# Patient Record
Sex: FEMALE | Race: WHITE | NOT HISPANIC OR LATINO | Employment: FULL TIME | URBAN - METROPOLITAN AREA
[De-identification: names, ages, dates, MRNs, and addresses within clinical notes are randomized per-mention and may not be internally consistent; named-entity substitution may affect disease eponyms.]

---

## 2019-07-25 ENCOUNTER — OFFICE VISIT (OUTPATIENT)
Dept: FAMILY MEDICINE CLINIC | Facility: CLINIC | Age: 19
End: 2019-07-25

## 2019-07-25 VITALS
DIASTOLIC BLOOD PRESSURE: 60 MMHG | TEMPERATURE: 98.6 F | RESPIRATION RATE: 16 BRPM | BODY MASS INDEX: 18.25 KG/M2 | HEART RATE: 88 BPM | OXYGEN SATURATION: 99 % | SYSTOLIC BLOOD PRESSURE: 96 MMHG | WEIGHT: 103 LBS | HEIGHT: 63 IN

## 2019-07-25 DIAGNOSIS — Z00.00 ANNUAL PHYSICAL EXAM: Primary | ICD-10-CM

## 2019-07-25 PROCEDURE — 99395 PREV VISIT EST AGE 18-39: CPT | Performed by: NURSE PRACTITIONER

## 2019-07-25 NOTE — PROGRESS NOTES
"  Chief Complaint   Patient presents with   • Establish Care   • Annual Exam       Subjective   Deborah Padilla is a 19 y.o. female and is here for new patient evaluation for a yearly physical exam. The patient reports no problems.  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  Clinicals start in fall. Needs to assure immunizations are up-to-date.      Do you take any herbs or supplements that were not prescribed by a doctor? no. If so, these will be added to active medication list.    The following portions of the patient's history were reviewed and updated as appropriate: allergies, current medications, past family history, past medical history, past social history, past surgical history and problem list.    Social and Family and Surgical History reviewed and updated today, see Rooming tab.    Health History, Preventive Measures and Vaccination flow sheets reviewed and updated today.    Patient's current medical chart in Epic; including previous office notes, imaging, labs, specialist's evaluation either in notes or in Media tab reviewed today.    Other pertinent medical information also reviewed thru Care Everywhere function is also reviewed today.    Review of Systems  Review of Systems  A comprehensive review of systems was negative.    Vitals:    07/25/19 0925   BP: 96/60   BP Location: Left arm   Patient Position: Sitting   Cuff Size: Adult   Pulse: 88   Resp: 16   Temp: 98.6 °F (37 °C)   SpO2: 99%   Weight: 46.7 kg (103 lb)   Height: 160 cm (63\")       General Appearance:  Alert, cooperative, no distress, appears stated age   Head:  Normocephalic, without obvious abnormality, atraumatic   Eyes:  PERRL, conjunctiva/corneas clear, EOM's intact.   Ears:  Normal TM's and external ear canals, both ears   Nose: Nares normal, septum midline, mucosa normal, no drainage or sinus tenderness   Throat: Lips, mucosa, and tongue normal; teeth and gums normal   Neck: Supple, symmetrical, trachea midline, no " adenopathy;   thyroid: No enlargement/tenderness/nodules; no carotid  bruit   Back:  Symmetric, no curvature, ROM normal, no CVA tenderness   Lungs:  Clear to auscultation bilaterally, respirations unlabored   Chest wall:  No tenderness or deformity   Heart:  Regular rate and rhythm, S1 and S2 normal, no murmur, rub or gallop   Abdomen:  Soft, non-tender, bowel sounds active all four quadrants,   no masses, no organomegaly   Rectal: Deffered       Extremities: Extremities normal, atraumatic, no cyanosis or edema   Pulses: 2+ and symmetric all extremities   Skin: Skin color, texture, turgor normal, no rashes or lesions   Lymph nodes: Cervical, supraclavicular, and axillary nodes normal   Neurologic: CNII-XII intact. Normal strength, sensation and reflexes   throughout        No results found for this or any previous visit.  Assessment/Plan   Healthy female exam.  Deborah was seen today for establish care and annual exam.    Diagnoses and all orders for this visit:    Annual physical exam  -     CBC & Differential  -     Comprehensive Metabolic Panel  -     TSH  -     Lipid Panel      1. Well adult exam  2. Patient Counseling:  --Nutrition: Stressed importance of moderation in sodium/caffeine intake, saturated fat and cholesterol.  Discussed caloric balance, sufficient intake of fresh fruits, vegetables, fiber, calcium, iron.  --Exercise: Stressed the importance of regular exercise.  --Substance Abuse: Discussed cessation/primary prevention of tobacco, alcohol, or other drug use; driving or other dangerous activities under the influence.    --Dental health: Discussed importance of regular tooth brushing, flossing, and dental visits.  -- suggested having eyes and vision checked if needed or past due.  --Immunizations reviewed.  She is up-to-date on all immunizations.  Form completed for nursing school showing immunizations are up-to-date and will be scanned under media tab.    ---Cholesterol screening:  Fasting labs  will be completed today and will call with results.    3. Discussed the patient's BMI with her.  The BMI is in the acceptable range  4. Follow up in one year for next annual physical including Pap.      There are no Patient Instructions on file for this visit.    There are no discontinued medications.     Mojgan Henry, GABRIELLA  Family Practice  [place], [place]  [facility]

## 2019-07-26 LAB
ALBUMIN SERPL-MCNC: 4.6 G/DL (ref 3.5–5.2)
ALBUMIN/GLOB SERPL: 1.4 G/DL
ALP SERPL-CCNC: 41 U/L (ref 39–117)
ALT SERPL-CCNC: 7 U/L (ref 1–33)
AST SERPL-CCNC: 14 U/L (ref 1–32)
BASOPHILS # BLD AUTO: 0.04 10*3/MM3 (ref 0–0.2)
BASOPHILS NFR BLD AUTO: 0.6 % (ref 0–1.5)
BILIRUB SERPL-MCNC: 0.4 MG/DL (ref 0.2–1.2)
BUN SERPL-MCNC: 11 MG/DL (ref 6–20)
BUN/CREAT SERPL: 13.1 (ref 7–25)
CALCIUM SERPL-MCNC: 9.4 MG/DL (ref 8.6–10.5)
CHLORIDE SERPL-SCNC: 107 MMOL/L (ref 98–107)
CHOLEST SERPL-MCNC: 151 MG/DL (ref 0–200)
CO2 SERPL-SCNC: 23.7 MMOL/L (ref 22–29)
CREAT SERPL-MCNC: 0.84 MG/DL (ref 0.57–1)
EOSINOPHIL # BLD AUTO: 0.06 10*3/MM3 (ref 0–0.4)
EOSINOPHIL NFR BLD AUTO: 0.9 % (ref 0.3–6.2)
ERYTHROCYTE [DISTWIDTH] IN BLOOD BY AUTOMATED COUNT: 12.5 % (ref 12.3–15.4)
GLOBULIN SER CALC-MCNC: 3.2 GM/DL
GLUCOSE SERPL-MCNC: 80 MG/DL (ref 65–99)
HCT VFR BLD AUTO: 42.7 % (ref 34–46.6)
HDLC SERPL-MCNC: 57 MG/DL (ref 40–60)
HGB BLD-MCNC: 13.9 G/DL (ref 12–15.9)
IMM GRANULOCYTES # BLD AUTO: 0.03 10*3/MM3 (ref 0–0.05)
IMM GRANULOCYTES NFR BLD AUTO: 0.4 % (ref 0–0.5)
LDLC SERPL CALC-MCNC: 71 MG/DL (ref 0–100)
LYMPHOCYTES # BLD AUTO: 1.43 10*3/MM3 (ref 0.7–3.1)
LYMPHOCYTES NFR BLD AUTO: 20.5 % (ref 19.6–45.3)
MCH RBC QN AUTO: 30.2 PG (ref 26.6–33)
MCHC RBC AUTO-ENTMCNC: 32.6 G/DL (ref 31.5–35.7)
MCV RBC AUTO: 92.8 FL (ref 79–97)
MONOCYTES # BLD AUTO: 0.53 10*3/MM3 (ref 0.1–0.9)
MONOCYTES NFR BLD AUTO: 7.6 % (ref 5–12)
NEUTROPHILS # BLD AUTO: 4.89 10*3/MM3 (ref 1.7–7)
NEUTROPHILS NFR BLD AUTO: 70 % (ref 42.7–76)
NRBC BLD AUTO-RTO: 0 /100 WBC (ref 0–0.2)
PLATELET # BLD AUTO: 236 10*3/MM3 (ref 140–450)
POTASSIUM SERPL-SCNC: 4.3 MMOL/L (ref 3.5–5.2)
PROT SERPL-MCNC: 7.8 G/DL (ref 6–8.5)
RBC # BLD AUTO: 4.6 10*6/MM3 (ref 3.77–5.28)
SODIUM SERPL-SCNC: 144 MMOL/L (ref 136–145)
TRIGL SERPL-MCNC: 115 MG/DL (ref 0–150)
TSH SERPL DL<=0.005 MIU/L-ACNC: 1.06 MIU/ML (ref 0.27–4.2)
VLDLC SERPL CALC-MCNC: 23 MG/DL
WBC # BLD AUTO: 6.98 10*3/MM3 (ref 3.4–10.8)

## 2021-03-24 ENCOUNTER — IMMUNIZATION (OUTPATIENT)
Dept: VACCINE CLINIC | Facility: HOSPITAL | Age: 21
End: 2021-03-24

## 2021-03-24 PROCEDURE — 0001A: CPT | Performed by: OBSTETRICS & GYNECOLOGY

## 2021-03-24 PROCEDURE — 91300 HC SARSCOV02 VAC 30MCG/0.3ML IM: CPT | Performed by: OBSTETRICS & GYNECOLOGY

## 2021-04-14 ENCOUNTER — IMMUNIZATION (OUTPATIENT)
Dept: VACCINE CLINIC | Facility: HOSPITAL | Age: 21
End: 2021-04-14

## 2021-04-14 PROCEDURE — 91300 HC SARSCOV02 VAC 30MCG/0.3ML IM: CPT | Performed by: OBSTETRICS & GYNECOLOGY

## 2021-04-14 PROCEDURE — 0002A: CPT | Performed by: OBSTETRICS & GYNECOLOGY

## 2021-08-23 ENCOUNTER — OFFICE VISIT (OUTPATIENT)
Dept: FAMILY MEDICINE CLINIC | Facility: CLINIC | Age: 21
End: 2021-08-23

## 2021-08-23 VITALS
HEIGHT: 63 IN | DIASTOLIC BLOOD PRESSURE: 60 MMHG | TEMPERATURE: 98.6 F | RESPIRATION RATE: 16 BRPM | BODY MASS INDEX: 19.14 KG/M2 | HEART RATE: 78 BPM | SYSTOLIC BLOOD PRESSURE: 96 MMHG | WEIGHT: 108 LBS | OXYGEN SATURATION: 99 %

## 2021-08-23 DIAGNOSIS — Z12.4 CERVICAL CANCER SCREENING: ICD-10-CM

## 2021-08-23 DIAGNOSIS — Z00.00 ANNUAL PHYSICAL EXAM: Primary | ICD-10-CM

## 2021-08-23 PROCEDURE — 99395 PREV VISIT EST AGE 18-39: CPT | Performed by: NURSE PRACTITIONER

## 2021-08-23 NOTE — PROGRESS NOTES
"Chief Complaint   Patient presents with   • Annual Exam       Patient Care Team:  Head, GABRIELLA De Guzman as PCP - General (Nurse Practitioner)    Subjective   Deborah Padilla is a 21 y.o. female and is here for a yearly physical exam. The patient reports no problems.    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.      Do you take any herbs or supplements that were not prescribed by a doctor? no. If so, these will be added to active medication list.    Health Habits:  Dental Exam: Up to date  Eye Exam: Up to date  Diet: Nothing specific  Exercise:   Current exercise activities include:   Pap:  Never    The following portions of the patient's history were reviewed and updated as appropriate: allergies, current medications, past family history, past medical history, past social history, past surgical history and problem list.    Social and Family and Surgical History reviewed and updated today, see Rooming tab.    Health History, Preventive Measures and Vaccination flow sheets reviewed and updated today.    Patient's current medical chart in Epic; including previous office notes, imaging, labs, specialist's evaluation either in notes or in Media tab reviewed today.    Other pertinent medical information also reviewed thru Care Everywhere function is also reviewed today.    Review of Systems  Review of Systems   Constitutional: Negative.    HENT: Negative.    Respiratory: Negative.    Cardiovascular: Negative.    Gastrointestinal: Negative.    Endocrine: Negative.    Genitourinary: Negative.    Musculoskeletal: Negative.    Skin: Negative.    Neurological: Negative.    Hematological: Negative.    Psychiatric/Behavioral: Negative.      Vitals:    08/23/21 1439   BP: 96/60   BP Location: Left arm   Patient Position: Sitting   Cuff Size: Adult   Pulse: 78   Resp: 16   Temp: 98.6 °F (37 °C)   SpO2: 99%   Weight: 49 kg (108 lb)   Height: 160 cm (63\")       General Appearance:  Alert, cooperative, no distress, appears " stated age.  Chaperone per Ying Segura CMA.   Head:  Normocephalic, without obvious abnormality, atraumatic   Eyes:  PERRL, conjunctiva/corneas clear, EOM's intact.   Ears:  Normal TM's and external ear canals, both ears   Nose: Nares normal, septum midline, mucosa normal, no drainage or sinus tenderness   Throat: Lips, mucosa, and tongue normal; teeth and gums normal   Neck: Supple, symmetrical, trachea midline, no adenopathy;   thyroid: No enlargement/tenderness/nodules       Lungs:  Clear to auscultation bilaterally, respirations even and unlabored   Chest wall:  No tenderness or deformity   Heart:  Regular rate and rhythm, S1 and S2 normal, no murmur, rub or gallop   Abdomen:  Soft, non-tender, bowel sounds active all four quadrants,   no masses, no organomegaly  :  Pelvic exam completed which was normal.  Vagina, cervix, uterus, and ovaries normal.  Pap collected.             Extremities: Extremities normal, atraumatic, no cyanosis or edema   Pulses: 2+ and symmetric all extremities   Skin: Skin color, texture, turgor normal, no rashes or lesions   Lymph nodes: Cervical, supraclavicular, and axillary nodes normal   Neurologic: CNII-XII intact. Normal strength, sensation and reflexes   throughout       Results for orders placed or performed in visit on 07/25/19   Comprehensive Metabolic Panel    Specimen: Blood    BLOOD   Result Value Ref Range    Glucose 80 65 - 99 mg/dL    BUN 11 6 - 20 mg/dL    Creatinine 0.84 0.57 - 1.00 mg/dL    eGFR Non African Am 86 >60 mL/min/1.73    eGFR African Am 105 >60 mL/min/1.73    BUN/Creatinine Ratio 13.1 7.0 - 25.0    Sodium 144 136 - 145 mmol/L    Potassium 4.3 3.5 - 5.2 mmol/L    Chloride 107 98 - 107 mmol/L    Total CO2 23.7 22.0 - 29.0 mmol/L    Calcium 9.4 8.6 - 10.5 mg/dL    Total Protein 7.8 6.0 - 8.5 g/dL    Albumin 4.60 3.50 - 5.20 g/dL    Globulin 3.2 gm/dL    A/G Ratio 1.4 g/dL    Total Bilirubin 0.4 0.2 - 1.2 mg/dL    Alkaline Phosphatase 41 39 - 117 U/L    AST  (SGOT) 14 1 - 32 U/L    ALT (SGPT) 7 1 - 33 U/L   TSH    Specimen: Blood    BLOOD   Result Value Ref Range    TSH 1.060 0.270 - 4.200 mIU/mL   Lipid Panel    Specimen: Blood    BLOOD   Result Value Ref Range    Total Cholesterol 151 0 - 200 mg/dL    Triglycerides 115 0 - 150 mg/dL    HDL Cholesterol 57 40 - 60 mg/dL    VLDL Cholesterol 23 mg/dL    LDL Cholesterol  71 0 - 100 mg/dL   CBC & Differential    Specimen: Blood    BLOOD   Result Value Ref Range    WBC 6.98 3.40 - 10.80 10*3/mm3    RBC 4.60 3.77 - 5.28 10*6/mm3    Hemoglobin 13.9 12.0 - 15.9 g/dL    Hematocrit 42.7 34.0 - 46.6 %    MCV 92.8 79.0 - 97.0 fL    MCH 30.2 26.6 - 33.0 pg    MCHC 32.6 31.5 - 35.7 g/dL    RDW 12.5 12.3 - 15.4 %    Platelets 236 140 - 450 10*3/mm3    Neutrophil Rel % 70.0 42.7 - 76.0 %    Lymphocyte Rel % 20.5 19.6 - 45.3 %    Monocyte Rel % 7.6 5.0 - 12.0 %    Eosinophil Rel % 0.9 0.3 - 6.2 %    Basophil Rel % 0.6 0.0 - 1.5 %    Neutrophils Absolute 4.89 1.70 - 7.00 10*3/mm3    Lymphocytes Absolute 1.43 0.70 - 3.10 10*3/mm3    Monocytes Absolute 0.53 0.10 - 0.90 10*3/mm3    Eosinophils Absolute 0.06 0.00 - 0.40 10*3/mm3    Basophils Absolute 0.04 0.00 - 0.20 10*3/mm3    Immature Granulocyte Rel % 0.4 0.0 - 0.5 %    Immature Grans Absolute 0.03 0.00 - 0.05 10*3/mm3    nRBC 0.0 0.0 - 0.2 /100 WBC     Assessment/Plan   Healthy female exam.  Diagnoses and all orders for this visit:    1. Annual physical exam (Primary)    2. Cervical cancer screening  -     IGP,rfx Aptima HPV All Pth      1. Deborah Padilla has been doing well since she was last seen.  She takes no prescription medication on a regular basis.  She is up-to-date on immunizations.  First Pap completed.  Advised if normal, repeat in 3 years.  We will have her return in 1 year for next annual physical.  In the meantime instructed contact us sooner for any problems or concerns.    2. Patient Counseling:  --Nutrition: Stressed importance of moderation in sodium/caffeine intake,       saturated fat and cholesterol.  Discussed caloric balance, sufficient intake of fresh fruits, vegetables, fiber, calcium, iron.  --Exercise: Stressed the importance of regular exercise.   --Substance Abuse: Discussed cessation/primary prevention of tobacco, alcohol,   or other drug use; driving or other dangerous activities under the influence.    --Dental health: Discussed importance of regular tooth brushing, flossing, and       dental visits.  --Suggested having eyes and vision checked if needed or past due.  --Immunizations reviewed.  --Discussed benefits of screening colonoscopy.  3. Discussed the patient's BMI with her.  The BMI is in the acceptable range  4. Follow up next physical in 1 year    Patient was given instructions and counseling regarding condition or for health maintenance advice.  Please see specific information pulled into the AVS if appropriate.      There are no discontinued medications.       Patient was wearing facemask when I entered the room and throughout our encounter. Protective equipment was worn throughout this patient encounter including a face mask and gloves. Hand hygiene was performed before donning protective equipment and after removal when leaving the room.     Mojgan Henry, GABRIELLA  Family Practice  [facility] [place]

## 2021-08-27 LAB
CONV .: ABNORMAL
CYTOLOGIST CVX/VAG CYTO: ABNORMAL
CYTOLOGY CVX/VAG DOC CYTO: ABNORMAL
CYTOLOGY CVX/VAG DOC THIN PREP: ABNORMAL
DX ICD CODE: ABNORMAL
DX ICD CODE: ABNORMAL
HIV 1 & 2 AB SER-IMP: ABNORMAL
HPV I/H RISK 4 DNA CVX QL PROBE+SIG AMP: POSITIVE
OTHER STN SPEC: ABNORMAL
PATHOLOGIST CVX/VAG CYTO: ABNORMAL
RECOM F/U CVX/VAG CYTO: ABNORMAL
STAT OF ADQ CVX/VAG CYTO-IMP: ABNORMAL

## 2021-11-30 ENCOUNTER — OFFICE VISIT (OUTPATIENT)
Dept: FAMILY MEDICINE CLINIC | Facility: CLINIC | Age: 21
End: 2021-11-30

## 2021-11-30 VITALS
SYSTOLIC BLOOD PRESSURE: 104 MMHG | OXYGEN SATURATION: 99 % | HEIGHT: 63 IN | WEIGHT: 109 LBS | TEMPERATURE: 98.6 F | BODY MASS INDEX: 19.31 KG/M2 | RESPIRATION RATE: 16 BRPM | DIASTOLIC BLOOD PRESSURE: 60 MMHG

## 2021-11-30 DIAGNOSIS — R87.618 ABNORMAL PAPANICOLAOU SMEAR OF CERVIX WITH POSITIVE HUMAN PAPILLOMA VIRUS (HPV) TEST: Primary | ICD-10-CM

## 2021-11-30 PROCEDURE — 99213 OFFICE O/P EST LOW 20 MIN: CPT | Performed by: NURSE PRACTITIONER

## 2021-11-30 NOTE — PROGRESS NOTES
Patient ID: Deborah Padilla is a 21 y.o. female     Patient Care Team:  Head, GABRIELLA De Guzman as PCP - General (Nurse Practitioner)    Subjective     Chief Complaint   Patient presents with   • Abnormal Pap Smear       History of Present Illness    Deborah Padilla presents to [facility] today for repeat Pap.  She had abnormal pap showing low grade squamous intraepithelial lesion. HPV positive.  This was her first Pap.  Recommended repeat in 3 months.  No change or new complaints since last seen.         She denies any complaints of fever, chills, cough, chest pain, shortness of air, abdominal pain, nausea, or any other concerns.     The following portions of the patient's history were reviewed and updated as appropriate: allergies, current medications, past family history, past medical history, past social history, past surgical history and problem list.       ROS    Vitals:    11/30/21 0922   BP: 104/60   Resp: 16   Temp: 98.6 °F (37 °C)   SpO2: 99%       Documented weights    11/30/21 0922   Weight: 49.4 kg (109 lb)     Body mass index is 19.31 kg/m².    Results for orders placed or performed in visit on 08/23/21   HPV DNA Probe, Direct - ,   Result Value Ref Range    HPV Aptima Positive (A) Negative   IGP,rfx Aptima HPV All Pth   Result Value Ref Range    Diagnosis Comment (A)     Recommendation: Comment (A)     Specimen adequacy: Comment     Clinician Provided ICD-10: Comment     Performed by: Comment     Electronically signed by: Comment     . .     Pathologist Provided ICD-10: Comment     Note: Comment     Method: Comment     Conv .conv Comment            Objective     Physical Exam  Vitals reviewed. Exam conducted with a chaperone present (Ying Segura CMA).   Cardiovascular:      Rate and Rhythm: Normal rate.   Pulmonary:      Effort: Pulmonary effort is normal.   Genitourinary:     Labia:         Right: No rash.         Left: No rash.       Vagina: Normal. No signs of injury.       Cervix: No cervical motion tenderness, discharge, friability, lesion or erythema.            Assessment/Plan     Assessment/Plan     Diagnoses and all orders for this visit:    1. Abnormal Papanicolaou smear of cervix with positive human papilloma virus (HPV) test (Primary)  -     IGP,rfx Aptima HPV All Pth   Will notify of results.   If remains abnormal, needs referral to GYN.        In the meantime, instructed to contact us sooner for any problems or concerns.    Follow Up:  Return if symptoms worsen or fail to improve.    Patient was given instructions and counseling regarding condition or for health maintenance advice.  Please see specific information pulled into the AVS if appropriate.      Patient was wearing facemask when I entered the room and throughout our encounter. Protective equipment was worn throughout this patient encounter including a face mask, eye protection,  and gloves. Hand hygiene was performed before donning protective equipment and after removal when leaving the room.     Mojgan Henry, APRN  Family Medicine  [facility] [place]  11/30/21  09:52 EST

## 2021-12-03 LAB
CONV .: NORMAL
CYTOLOGIST CVX/VAG CYTO: NORMAL
CYTOLOGY CVX/VAG DOC CYTO: NORMAL
CYTOLOGY CVX/VAG DOC THIN PREP: NORMAL
DX ICD CODE: NORMAL
HIV 1 & 2 AB SER-IMP: NORMAL
OTHER STN SPEC: NORMAL
STAT OF ADQ CVX/VAG CYTO-IMP: NORMAL